# Patient Record
Sex: FEMALE | Race: BLACK OR AFRICAN AMERICAN | NOT HISPANIC OR LATINO | Employment: OTHER | URBAN - METROPOLITAN AREA
[De-identification: names, ages, dates, MRNs, and addresses within clinical notes are randomized per-mention and may not be internally consistent; named-entity substitution may affect disease eponyms.]

---

## 2021-03-22 ENCOUNTER — IMMUNIZATION (OUTPATIENT)
Dept: VACCINE CLINIC | Facility: HOSPITAL | Age: 61
End: 2021-03-22

## 2021-03-22 PROCEDURE — 91300 HC SARSCOV02 VAC 30MCG/0.3ML IM: CPT | Performed by: INTERNAL MEDICINE

## 2021-03-22 PROCEDURE — 0001A: CPT | Performed by: INTERNAL MEDICINE

## 2021-04-12 ENCOUNTER — IMMUNIZATION (OUTPATIENT)
Dept: VACCINE CLINIC | Facility: HOSPITAL | Age: 61
End: 2021-04-12

## 2021-04-12 PROCEDURE — 0002A: CPT | Performed by: INTERNAL MEDICINE

## 2021-04-12 PROCEDURE — 91300 HC SARSCOV02 VAC 30MCG/0.3ML IM: CPT | Performed by: INTERNAL MEDICINE

## 2025-04-09 ENCOUNTER — APPOINTMENT (OUTPATIENT)
Dept: CT IMAGING | Facility: HOSPITAL | Age: 65
End: 2025-04-09
Payer: COMMERCIAL

## 2025-04-09 ENCOUNTER — APPOINTMENT (OUTPATIENT)
Dept: GENERAL RADIOLOGY | Facility: HOSPITAL | Age: 65
End: 2025-04-09
Payer: COMMERCIAL

## 2025-04-09 ENCOUNTER — HOSPITAL ENCOUNTER (EMERGENCY)
Facility: HOSPITAL | Age: 65
Discharge: HOME | End: 2025-04-09
Attending: EMERGENCY MEDICINE | Admitting: EMERGENCY MEDICINE
Payer: COMMERCIAL

## 2025-04-09 VITALS
RESPIRATION RATE: 20 BRPM | BODY MASS INDEX: 51.91 KG/M2 | WEIGHT: 293 LBS | OXYGEN SATURATION: 99 % | TEMPERATURE: 97.8 F | DIASTOLIC BLOOD PRESSURE: 77 MMHG | HEART RATE: 82 BPM | SYSTOLIC BLOOD PRESSURE: 169 MMHG | HEIGHT: 63 IN
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
## 2025-04-09 DIAGNOSIS — Z98.84 HISTORY OF GASTRIC RESTRICTIVE SURGERY: ICD-10-CM

## 2025-04-09 DIAGNOSIS — Z86.79 HISTORY OF HYPERTENSION: ICD-10-CM

## 2025-04-09 DIAGNOSIS — S40.012A CONTUSION OF LEFT SHOULDER, INITIAL ENCOUNTER: ICD-10-CM

## 2025-04-09 DIAGNOSIS — S16.1XXA ACUTE STRAIN OF NECK MUSCLE, INITIAL ENCOUNTER: ICD-10-CM

## 2025-04-09 DIAGNOSIS — S29.019A THORACIC MYOFASCIAL STRAIN, INITIAL ENCOUNTER: ICD-10-CM

## 2025-04-09 PROCEDURE — 72125 CT NECK SPINE W/O DYE: CPT

## 2025-04-09 PROCEDURE — 99284 EMERGENCY DEPT VISIT MOD MDM: CPT

## 2025-04-09 PROCEDURE — 72128 CT CHEST SPINE W/O DYE: CPT

## 2025-04-09 PROCEDURE — 73030 X-RAY EXAM OF SHOULDER: CPT

## 2025-04-09 PROCEDURE — 70450 CT HEAD/BRAIN W/O DYE: CPT

## 2025-04-09 RX ORDER — METHOCARBAMOL 500 MG/1
500 TABLET, FILM COATED ORAL 2 TIMES DAILY PRN
Qty: 14 TABLET | Refills: 0 | Status: SHIPPED | OUTPATIENT
Start: 2025-04-09

## 2025-04-09 RX ORDER — IBUPROFEN 400 MG/1
400 TABLET, FILM COATED ORAL ONCE
Status: COMPLETED | OUTPATIENT
Start: 2025-04-09 | End: 2025-04-09

## 2025-04-09 RX ORDER — METHOCARBAMOL 500 MG/1
500 TABLET, FILM COATED ORAL ONCE
Status: COMPLETED | OUTPATIENT
Start: 2025-04-09 | End: 2025-04-09

## 2025-04-09 RX ADMIN — IBUPROFEN 400 MG: 400 TABLET, FILM COATED ORAL at 22:47

## 2025-04-09 RX ADMIN — METHOCARBAMOL 500 MG: 500 TABLET ORAL at 22:47

## 2025-04-09 NOTE — Clinical Note
[facility] EMERGENCY DEPARTMENT  [address]  [facility] [address]  Phone: [phone]    Joaquina Lindsay was seen and treated in our emergency department on 4/9/2025.  She may return to work on 04/13/2025.         Thank you for choosing [facility].    Annabelle Calix, ALYSHA
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
## 2025-04-10 NOTE — DISCHARGE INSTRUCTIONS
ER evaluation was reassuring.  CT of the brain was within normal limits and CT of the cervical and thoracic spine revealed degenerative or arthritic changes but no acute compression fracture or traumatic injury.  X-rays of the left shoulder were within normal limits.  Recommend patient to alternate heat and ice to the affected muscle groups.  Take Tylenol every 4-6 hours as needed for pain and Rx for Robaxin 500 mg by mouth twice daily as needed for muscle tightness and spasm.  Recommend close PCP follow-up for recheck.  Return to the ER if worsening symptoms.  Continue with all other current medical management.